# Patient Record
(demographics unavailable — no encounter records)

---

## 2025-05-02 NOTE — ASSESSMENT
[Vaccines Reviewed] : Immunizations reviewed today. Please see immunization details in the vaccine log within the immunization flowsheet.  [FreeTextEntry1] : Chest pain Associated in the context of worsening anxiety, does have sternal chest discomfort pressure, this could be costochondritis or secondary to anxiety.  Strictly nonexertional, low suspect for ACS, did offer EKG she would like to have this done.

## 2025-05-02 NOTE — HEALTH RISK ASSESSMENT
[Fair] :  ~his/her~ mood as fair [Yes] : Yes [Monthly or less (1 pt)] : Monthly or less (1 point) [1 or 2 (0 pts)] : 1 or 2 (0 points) [Never (0 pts)] : Never (0 points) [Little interest or pleasure doing things] : 1) Little interest or pleasure doing things [Feeling down, depressed, or hopeless] : 2) Feeling down, depressed, or hopeless [0] : 2) Feeling down, depressed, or hopeless: Not at all (0) [Patient reported PAP Smear was abnormal] : Patient reported PAP Smear was abnormal [Never] : Never [NO] : No [Audit-CScore] : 1. NOt every month [CYE0Rcmwr] : 0 [PapSmearDate] : 2022 [PapSmearComments] : Pt had a colposcopy due to abnormal pap.

## 2025-05-02 NOTE — HISTORY OF PRESENT ILLNESS
[de-identified] : Subjective:     Patient ID: MONIQUE SANDOVAL  is a 25 year  F .   She is originally from the [place] moved to [place] 2022, Primary previously in the [place] in a primary care Center last year. Overall, has hd histoy of single episode major depression 2019 she had lives and mostly raised in the [facility] and had moved back to the [place] felt culutre shock stress, attempted suicide cutting, was interned 5 days DC with counselling service wshe did not follow, since the nno recurrence.  She does have having increased anxiety since her mother had a heart attack not sure when but within the past 3 years. Noting since then feeling more worried specifically regarding her health, other stressors, uncle whom raised her recently noted he has stage 4 cancer, him and her mother live in [facility], he is actively having sxs, mother also recently diagnosed DM, she is understandably, she became tearful during the interview when discssing these issues.  she works as a  at [employer], manages several team members, high demand she notes causes her stress tothe point of having a panic attack. Notes 5 days ago one night was awoken byh er  but then began having rambling thoughts aout her job, began hyperventilaiting, trying t cartch her breath then began hysterically crying, chest felt heavy, shaking heart racing, feeling cold clammy lasted for several minutes went to Urgent care was evaluated in [place], normal exam, likely chest wall muscle strain.  Notes commonly will feel concerned for things that would not commonly affect her, notes at baseline not having apettite, commonly will skip meals notes since being a child has not been accostumed to eaing regular meals, eat only if hungry which can be up to 2-3 days without a meal maybe snacking. She notes feling hpervigilant, will get chest pressure when feeling stressed recurring last today while resting non exertional, sleep has alternated will have several nights of trouble falling asleep, worried of bad things happening.

## 2025-05-02 NOTE — PHYSICAL EXAM
[TextEntry] : General: Patient is a well-nourished, well-developed @SEX@, alert and oriented 3 in no acute distress.   HEENT: Normocephalic, atraumatic, EOMI, PERRLA. Moist mucous membranes. Neck: Neck was supple without JVD. There is no cervical adenopathy. Lungs: Nonlaboured breaths. No audible stridor. Lungs with clear bilateral vesicular breath sounds to auscultation with adequate air movement and normal diaphragmatic exertion .  No wheezes rhonchi or rales were noted.   Heart: Heart was regular rate and rhythm without murmur, rubs or gallops. Chest wall: Tenderness to palpation over the lower sternal region Extremities: Examination of the extremities revealed full range of motion of all extremities.   Neuro: Neurologically the patient was awake, alert and oriented to person, place and time.  Cranial nerves II-XII WNL.  No obvious focal neurological deficits noted.  Gait was within normal limits.  No sensory or motor deficits. Psych: Appropriate affect and mood.  Patient makes good eye contact.

## 2025-07-02 NOTE — HEALTH RISK ASSESSMENT
[Good] : ~his/her~  mood as  good [No] : No [Yes] : In the past 12 months have you used drugs other than those required for medical reasons? Yes [0] : 2) Feeling down, depressed, or hopeless: Not at all (0) [Never] : Never [HIV Test offered] : HIV Test offered [Hepatitis C test offered] : Hepatitis C test offered [Employed] : employed [College] : College [] :  [Sexually Active] : sexually active [de-identified] : Damian 1 blunt a day. 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  Denies discharge no dysuria, no genital rashes, ulcerations or masses.

## 2025-07-02 NOTE — HISTORY OF PRESENT ILLNESS
[de-identified] : Subjective:     Patient ID: MONIQUE SANDOVAL  is a 25 year  F .     Patient is here today for her  physical exam.   Oral: brushes teeth daily, floss  daily, does not see dentist twice a year. Diet: Most meals are borh home and out. Weekends at home. Out usually restaurants varied fast food, [employer]. DOes not have organized eating scheudle may have a meal a day. As aove not very healthy options at time, single meal cna be small.  Exercise: Not regularly in past couple of weeks walking more Weight: 133 lbs BMI 22 has purposely gained weight eating more regularly. Work: See history Social: see history. Safety: wears seat belts, does wear sunscreen. LMP: 6/10. Generally irregular 3-5 weeks, was more regular when on OCP until 2021. not heavy bleeding or cramping. Posible history of anemia, has been given, does not take prental vitamin, womens vitamin. Currently n fatigue Last PAP: 2022 had colposcopy LSIL needs fu Gyn will refer Immunizations: Tetanus believes hse has received past 10 years will rget records BP: Stable

## 2025-07-02 NOTE — PHYSICAL EXAM
[TextEntry] : General: Patient is a well-nourished, well-developed @SEX@, alert and oriented 3 in no acute distress. HEENT: Normocephalic, atraumatic, EOMI, PERRLA. Moist mucous membranes. No nasal congestion, no nasal secretions or concretions. No tenderness on palpation of sinus cavities. Nasal mucosa with pink color. Inspection of oropharynx with no erythema or swelling. Ear examination shows no swelling or erythema noted in the outer canals. The tympanic membranes appear translucent, no fluid noted posterior to the tympanic membrane. Neck: Neck was supple without JVD or carotid bruits.  Carotids were easily palpable bilaterally. There is no palpable goiter or mass. There is no cervical adenopathy. Lungs: Nonlaboured breaths. No audible stridor. Lungs with clear bilateral vesicular breath sounds to auscultation with adequate air movement and normal diaphragmatic exertion .  No wheezes rhonchi or rales were noted.   Heart: Heart was regular rate and rhythm without murmur, rubs or gallops. Abdomen: Abdominal exam revealed normal bowel sounds.  The abdomen was soft, nontender in all 4 quadrants with no guarding or rigidity, no masses, organomegaly. Genitalia: With nurse present pelvic exam was performed.  Vaginal mucosa pink with no lesions, cervical os intact.?Uterus appears ***flexed and ***verted. There is no tenderness?or lesion?on the labia bilaterally. Cervix exhibits no motion tenderness. Bilateral adnexum displays no mass?and no tenderness. Extremities: Examination of the extremities revealed full range of motion of all extremities.  Palpable pedal pulses bilaterally.  No edema.  Neurovascular intact bilaterally. Neuro: Neurologically the patient was awake, alert and oriented to person, place and time.  Cranial nerves II-XII WNL.  No obvious focal neurological deficits noted.  Gait was within normal limits.  No sensory or motor deficits. Psych: Appropriate affect and mood.  Patient makes good eye contact.

## 2025-07-02 NOTE — HISTORY OF PRESENT ILLNESS
[de-identified] : Subjective:     Patient ID: MONIQUE SANDOVAL  is a 25 year  F .     Patient is here today for her  physical exam.   Oral: brushes teeth daily, floss  daily, does not see dentist twice a year. Diet: Most meals are borh home and out. Weekends at home. Out usually restaurants varied fast food, [employer]. DOes not have organized eating scheudle may have a meal a day. As aove not very healthy options at time, single meal cna be small.  Exercise: Not regularly in past couple of weeks walking more Weight: 133 lbs BMI 22 has purposely gained weight eating more regularly. Work: See history Social: see history. Safety: wears seat belts, does wear sunscreen. LMP: 6/10. Generally irregular 3-5 weeks, was more regular when on OCP until 2021. not heavy bleeding or cramping. Posible history of anemia, has been given, does not take prental vitamin, womens vitamin. Currently n fatigue Last PAP: 2022 had colposcopy LSIL needs fu Gyn will refer Immunizations: Tetanus believes hse has received past 10 years will rget records BP: Stable

## 2025-07-02 NOTE — HEALTH RISK ASSESSMENT
[Good] : ~his/her~  mood as  good [No] : No [Yes] : In the past 12 months have you used drugs other than those required for medical reasons? Yes [0] : 2) Feeling down, depressed, or hopeless: Not at all (0) [Never] : Never [HIV Test offered] : HIV Test offered [Hepatitis C test offered] : Hepatitis C test offered [Employed] : employed [College] : College [] :  [Sexually Active] : sexually active [de-identified] : Damian 1 blunt a day. 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  Denies discharge no dysuria, no genital rashes, ulcerations or masses.

## 2025-07-02 NOTE — ASSESSMENT
[Vaccines Reviewed] : Immunizations reviewed today. Please see immunization details in the vaccine log within the immunization flowsheet.  [FreeTextEntry1] : Physical Diet and exercise recs provided please see instructions Visit dentist twice a year, brush twice a day, floss daily Sunscreen spf 30 Irregular menses, prior on OPC well controlled. May be associated to poor diet malnutrition, stress anxiety. Referral to nuttritionist f to help with diet Recommend prenatal or womens vitamin Last PAP: 2022 was ASCUS with HPV (+) for 16 had colposcopy LSIL needs fu Gyn will refer Immunizations: Tetanus believes hse has received past 10 years will rget records BP: Stable.

## 2025-07-15 NOTE — PHYSICAL EXAM
[TextEntry] : General: Patient is a well-nourished, well-developed @SEX@, alert and oriented 3 in no acute distress.   HEENT: Normocephalic, atraumatic, EOMI, PERRLA. Moist mucous membranes Lungs: Nonlaboured breaths. No audible stridor. Lungs with clear bilateral vesicular breath sounds to auscultation with adequate air movement and normal diaphragmatic exertion .  No wheezes rhonchi or rales were noted.   Heart: Heart was regular rate and rhythm without murmur, rubs or gallops. Extremities: Examination of the extremities revealed full range of motion of all extremities. . Psych: Appropriate affect and mood.  Patient makes good eye contact.  Statement Selected

## 2025-07-15 NOTE — ASSESSMENT
[FreeTextEntry1] : Positiv epregnacy test.  She is a 26 yo female A1 , works in [employer] she was late with her last period approc  and tested psotive pregancy home kit. She has been noting 1 week with nausea this morning vomitred, curetnyl feels somewhat nauseois othewrwise well, expected pregabcy she is excited as is her .  I discussed we need to confirm with POCT pregnancy test, will get preliminary labs STD panel, rubella immunity, her CBC recently was stable she has not been taking a prenatal vitamin will get iron levels, stressed importance of taking daily prenatal vitamin starting now, sent to pharmacy, referral to GYN they will be evaluated and if needed getting first trimester ultrasound for estimating conception date which is currently would be somewhere between mid and late .  In the meantime if having any abdominal pain discomfort cramping vaginal bleeding please notify office immediately, patient understands and agrees and will follow-up with OB/GYN

## 2025-07-15 NOTE — HISTORY OF PRESENT ILLNESS
[FreeTextEntry8] :  Subjective:   Patient ID: MONIQUE SANDOVAL is a 25 year F here for positive pregnancy test,  She is a 24 yo female A1 , works in [employer]. She generally has irregular periods can vary from 28 to 35 days, her last LMP was 6/7 she noted being late this month and did a home pregnacy test that came back positive. SHe had beentring this was a voluntary conception. She feels emotionally good, begna noting nausea 1 week ago, some morning sickness this morning vomiting otherwise mostly nausea. She denies abdominal pelvic pain, no camping, no vaginal bleeding. She hsa not been taking prenaal or women multivitamin